# Patient Record
Sex: MALE
[De-identification: names, ages, dates, MRNs, and addresses within clinical notes are randomized per-mention and may not be internally consistent; named-entity substitution may affect disease eponyms.]

---

## 2024-06-27 ENCOUNTER — APPOINTMENT (OUTPATIENT)
Dept: UROLOGY | Facility: CLINIC | Age: 64
End: 2024-06-27
Payer: COMMERCIAL

## 2024-06-27 VITALS
BODY MASS INDEX: 28.34 KG/M2 | HEART RATE: 62 BPM | TEMPERATURE: 96.6 F | HEIGHT: 68 IN | DIASTOLIC BLOOD PRESSURE: 75 MMHG | OXYGEN SATURATION: 98 % | WEIGHT: 187 LBS | SYSTOLIC BLOOD PRESSURE: 135 MMHG

## 2024-06-27 DIAGNOSIS — N52.35 ERECTILE DYSFUNCTION FOLLOWING RADIATION THERAPY: ICD-10-CM

## 2024-06-27 PROBLEM — Z00.00 ENCOUNTER FOR PREVENTIVE HEALTH EXAMINATION: Status: ACTIVE | Noted: 2024-06-27

## 2024-06-27 PROCEDURE — 99204 OFFICE O/P NEW MOD 45 MIN: CPT

## 2024-06-27 PROCEDURE — G2211 COMPLEX E/M VISIT ADD ON: CPT | Mod: NC

## 2024-06-28 ENCOUNTER — APPOINTMENT (OUTPATIENT)
Dept: UROLOGY | Facility: CLINIC | Age: 64
End: 2024-06-28

## 2024-07-01 ENCOUNTER — APPOINTMENT (OUTPATIENT)
Dept: UROLOGY | Facility: CLINIC | Age: 64
End: 2024-07-01
Payer: COMMERCIAL

## 2024-07-01 VITALS
BODY MASS INDEX: 28.13 KG/M2 | WEIGHT: 185 LBS | DIASTOLIC BLOOD PRESSURE: 75 MMHG | HEART RATE: 61 BPM | TEMPERATURE: 97.5 F | SYSTOLIC BLOOD PRESSURE: 146 MMHG | OXYGEN SATURATION: 98 %

## 2024-07-01 DIAGNOSIS — C61 MALIGNANT NEOPLASM OF PROSTATE: ICD-10-CM

## 2024-07-01 LAB
APPEARANCE: CLEAR
BACTERIA UR CULT: NORMAL
BACTERIA: NEGATIVE /HPF
BILIRUBIN URINE: NEGATIVE
BLOOD URINE: NEGATIVE
CAST: 0 /LPF
COLOR: YELLOW
EPITHELIAL CELLS: 0 /HPF
GLUCOSE QUALITATIVE U: NEGATIVE MG/DL
KETONES URINE: NEGATIVE MG/DL
LEUKOCYTE ESTERASE URINE: NEGATIVE
MICROSCOPIC-UA: NORMAL
NITRITE URINE: NEGATIVE
PH URINE: 7.5
PROTEIN URINE: NEGATIVE MG/DL
RED BLOOD CELLS URINE: 0 /HPF
SPECIFIC GRAVITY URINE: 1.01
UROBILINOGEN URINE: 0.2 MG/DL
WHITE BLOOD CELLS URINE: 0 /HPF

## 2024-07-01 PROCEDURE — 96402 CHEMO HORMON ANTINEOPL SQ/IM: CPT

## 2024-07-01 RX ORDER — LEUPROLIDE ACETATE 22.5 MG/.375ML
22.5 INJECTION, SUSPENSION, EXTENDED RELEASE SUBCUTANEOUS
Refills: 0 | Status: COMPLETED | OUTPATIENT
Start: 2024-07-01

## 2024-07-01 RX ORDER — LEUPROLIDE ACETATE 22.5 MG/.375ML
22.5 INJECTION, SUSPENSION, EXTENDED RELEASE SUBCUTANEOUS
Qty: 1 | Refills: 0 | Status: ACTIVE | OUTPATIENT
Start: 2024-07-01

## 2024-07-01 RX ADMIN — LEUPROLIDE ACETATE 0 MG: 22.5 INJECTION, SUSPENSION, EXTENDED RELEASE SUBCUTANEOUS at 00:00

## 2024-07-05 ENCOUNTER — TRANSCRIPTION ENCOUNTER (OUTPATIENT)
Age: 64
End: 2024-07-05

## 2024-08-26 ENCOUNTER — APPOINTMENT (OUTPATIENT)
Dept: UROLOGY | Facility: CLINIC | Age: 64
End: 2024-08-26
Payer: COMMERCIAL

## 2024-08-26 VITALS
SYSTOLIC BLOOD PRESSURE: 146 MMHG | BODY MASS INDEX: 28.04 KG/M2 | HEIGHT: 68 IN | HEART RATE: 71 BPM | TEMPERATURE: 97.5 F | OXYGEN SATURATION: 98 % | DIASTOLIC BLOOD PRESSURE: 75 MMHG | WEIGHT: 185 LBS

## 2024-08-26 DIAGNOSIS — C61 MALIGNANT NEOPLASM OF PROSTATE: ICD-10-CM

## 2024-08-26 PROCEDURE — 99213 OFFICE O/P EST LOW 20 MIN: CPT

## 2024-08-26 PROCEDURE — G2211 COMPLEX E/M VISIT ADD ON: CPT | Mod: NC

## 2024-08-26 NOTE — HISTORY OF PRESENT ILLNESS
[FreeTextEntry1] : 65 yo male with history of prostate cancer. He underwent cyberknife about 2 years ago and started ADT Sept 2022. He is here regarding second opinion regarding ADT.  Reviewed his most recent PSA from about 1 month ago which was less than 0.01.  He is due for his next injection which would complete 2 years of ADT and would like to know if he needs to complete a full 3-year course.  States that he does experience low energy and mood as well as hot flashes.  States that he has had issues with erectile dysfunction and decreased libido however he is able to get erections.  States that at times the erections are hard but many times they are not as adequate as he would like.  We reviewed his pathology with 1 core of G 4+4. He received cyberknife therapy and has been undergoing ADT starting in Sept 2022.

## 2024-08-26 NOTE — ASSESSMENT
[FreeTextEntry1] : 64-year-old male with history of prostate cancer who underwent CyberKnife 2 years ago and was started on every 3 month Eligard injections.  He received his 3-month Eligard injection July 1, 2024 and had his chart from his prior urologist sent for review.  We discussed his history regarding his prostate cancer.  He was initially found to have prostate cancer in 2019 with Vic 3+3.  He had elected active surveillance at that time.  He had a elevated PSA of 14 and underwent repeat biopsy demonstrating 1 core of [place] 4+4 as well as other cores of 3+3 and 3+4.  He had elected to undergo CyberKnife in 2022 and began ADT in September 2022.  We reviewed and discussed these results and given his high risk category, through shared decision making, we decided to complete the full 3-year course of ADT.  He will follow-up in October for his Eligard injection.  We will also draw PSA at that time.  Plan Reviewed chart and discussed prostate cancer biopsy as well as risk stratification  Discussed benefits of continued ADT Will continue with ADT for full 3 year course Patient to [facility] in Oct 2024 for next injection. Will provide with 4 month depot if available PSA to be drawn at [facility]   Patient is being seen today for evaluation and management of a chronic and longitudinal ongoing condition and I am of the primary treating physician.

## 2024-10-10 ENCOUNTER — APPOINTMENT (OUTPATIENT)
Dept: UROLOGY | Facility: CLINIC | Age: 64
End: 2024-10-10
Payer: COMMERCIAL

## 2024-10-10 VITALS
SYSTOLIC BLOOD PRESSURE: 137 MMHG | RESPIRATION RATE: 15 BRPM | DIASTOLIC BLOOD PRESSURE: 73 MMHG | HEIGHT: 68 IN | OXYGEN SATURATION: 99 % | BODY MASS INDEX: 28.04 KG/M2 | TEMPERATURE: 97 F | HEART RATE: 60 BPM | WEIGHT: 185 LBS

## 2024-10-10 DIAGNOSIS — C61 MALIGNANT NEOPLASM OF PROSTATE: ICD-10-CM

## 2024-10-10 PROCEDURE — 96402 CHEMO HORMON ANTINEOPL SQ/IM: CPT

## 2024-10-10 RX ORDER — LEUPROLIDE ACETATE 30 MG/.5ML
30 INJECTION, SUSPENSION, EXTENDED RELEASE SUBCUTANEOUS
Qty: 1 | Refills: 0 | Status: ACTIVE | OUTPATIENT
Start: 2024-10-10

## 2024-10-14 LAB
PSA FREE FLD-MCNC: NORMAL %
PSA FREE SERPL-MCNC: <0.01 NG/ML
PSA SERPL-MCNC: <0.01 NG/ML

## 2025-01-06 ENCOUNTER — APPOINTMENT (OUTPATIENT)
Dept: UROLOGY | Facility: CLINIC | Age: 65
End: 2025-01-06
Payer: COMMERCIAL

## 2025-01-06 DIAGNOSIS — C61 MALIGNANT NEOPLASM OF PROSTATE: ICD-10-CM

## 2025-01-06 PROCEDURE — 99214 OFFICE O/P EST MOD 30 MIN: CPT

## 2025-01-06 PROCEDURE — G2211 COMPLEX E/M VISIT ADD ON: CPT | Mod: NC

## 2025-02-05 RX ORDER — TADALAFIL 20 MG/1
20 TABLET ORAL
Qty: 30 | Refills: 5 | Status: ACTIVE | COMMUNITY
Start: 2025-02-05 | End: 1900-01-01

## 2025-02-13 ENCOUNTER — APPOINTMENT (OUTPATIENT)
Dept: UROLOGY | Facility: CLINIC | Age: 65
End: 2025-02-13
Payer: COMMERCIAL

## 2025-02-13 VITALS
SYSTOLIC BLOOD PRESSURE: 149 MMHG | HEART RATE: 72 BPM | TEMPERATURE: 96.8 F | DIASTOLIC BLOOD PRESSURE: 83 MMHG | OXYGEN SATURATION: 98 %

## 2025-02-13 DIAGNOSIS — N52.35 ERECTILE DYSFUNCTION FOLLOWING RADIATION THERAPY: ICD-10-CM

## 2025-02-13 DIAGNOSIS — C61 MALIGNANT NEOPLASM OF PROSTATE: ICD-10-CM

## 2025-02-13 PROCEDURE — 96402 CHEMO HORMON ANTINEOPL SQ/IM: CPT

## 2025-02-13 PROCEDURE — 99214 OFFICE O/P EST MOD 30 MIN: CPT | Mod: 25

## 2025-02-13 RX ORDER — LEUPROLIDE ACETATE 30 MG/.5ML
30 INJECTION, SUSPENSION, EXTENDED RELEASE SUBCUTANEOUS
Refills: 0 | Status: COMPLETED | OUTPATIENT
Start: 2025-02-13

## 2025-02-13 RX ORDER — TADALAFIL 10 MG/1
10 TABLET, FILM COATED ORAL
Qty: 30 | Refills: 5 | Status: ACTIVE | COMMUNITY
Start: 2025-02-13 | End: 1900-01-01

## 2025-02-13 RX ADMIN — LEUPROLIDE ACETATE 0 MG: 30 INJECTION, SUSPENSION, EXTENDED RELEASE SUBCUTANEOUS at 00:00

## 2025-02-14 LAB
PSA FREE FLD-MCNC: NORMAL %
PSA FREE SERPL-MCNC: <0.01 NG/ML
PSA SERPL-MCNC: <0.01 NG/ML

## 2025-02-14 RX ORDER — TADALAFIL 5 MG/1
5 TABLET ORAL
Qty: 90 | Refills: 1 | Status: ACTIVE | COMMUNITY
Start: 2025-02-14 | End: 1900-01-01

## 2025-03-14 ENCOUNTER — APPOINTMENT (OUTPATIENT)
Dept: UROLOGY | Facility: CLINIC | Age: 65
End: 2025-03-14
Payer: COMMERCIAL

## 2025-03-14 DIAGNOSIS — C61 MALIGNANT NEOPLASM OF PROSTATE: ICD-10-CM

## 2025-03-14 DIAGNOSIS — N52.35 ERECTILE DYSFUNCTION FOLLOWING RADIATION THERAPY: ICD-10-CM

## 2025-03-14 PROCEDURE — G2211 COMPLEX E/M VISIT ADD ON: CPT | Mod: NC,95

## 2025-03-14 PROCEDURE — 99213 OFFICE O/P EST LOW 20 MIN: CPT | Mod: 95

## 2025-04-24 ENCOUNTER — APPOINTMENT (OUTPATIENT)
Dept: UROLOGY | Facility: CLINIC | Age: 65
End: 2025-04-24
Payer: COMMERCIAL

## 2025-04-24 DIAGNOSIS — C61 MALIGNANT NEOPLASM OF PROSTATE: ICD-10-CM

## 2025-04-24 DIAGNOSIS — N52.35 ERECTILE DYSFUNCTION FOLLOWING RADIATION THERAPY: ICD-10-CM

## 2025-04-24 PROCEDURE — 99214 OFFICE O/P EST MOD 30 MIN: CPT | Mod: 95

## 2025-04-24 PROCEDURE — G2211 COMPLEX E/M VISIT ADD ON: CPT | Mod: NC,95

## 2025-05-20 ENCOUNTER — NON-APPOINTMENT (OUTPATIENT)
Age: 65
End: 2025-05-20

## 2025-06-19 ENCOUNTER — APPOINTMENT (OUTPATIENT)
Dept: UROLOGY | Facility: CLINIC | Age: 65
End: 2025-06-19
Payer: COMMERCIAL

## 2025-06-19 VITALS
DIASTOLIC BLOOD PRESSURE: 73 MMHG | WEIGHT: 182 LBS | HEART RATE: 60 BPM | TEMPERATURE: 97.1 F | SYSTOLIC BLOOD PRESSURE: 146 MMHG | BODY MASS INDEX: 27.58 KG/M2 | HEIGHT: 68 IN | OXYGEN SATURATION: 99 %

## 2025-06-19 PROCEDURE — 99214 OFFICE O/P EST MOD 30 MIN: CPT

## 2025-06-19 PROCEDURE — G2211 COMPLEX E/M VISIT ADD ON: CPT | Mod: NC

## 2025-06-20 LAB
PSA FREE FLD-MCNC: NORMAL %
PSA FREE SERPL-MCNC: <0.01 NG/ML
PSA SERPL-MCNC: <0.01 NG/ML
TESTOST FREE SERPL-MCNC: 0.7 PG/ML
TESTOST SERPL-MCNC: 7.4 NG/DL

## 2025-08-13 ENCOUNTER — APPOINTMENT (OUTPATIENT)
Dept: UROLOGY | Facility: CLINIC | Age: 65
End: 2025-08-13